# Patient Record
Sex: MALE | Race: WHITE | NOT HISPANIC OR LATINO | URBAN - METROPOLITAN AREA
[De-identification: names, ages, dates, MRNs, and addresses within clinical notes are randomized per-mention and may not be internally consistent; named-entity substitution may affect disease eponyms.]

---

## 2021-02-08 ENCOUNTER — IMPORTED ENCOUNTER (OUTPATIENT)
Dept: URBAN - METROPOLITAN AREA CLINIC 50 | Facility: CLINIC | Age: 81
End: 2021-02-08

## 2021-04-09 ENCOUNTER — IMPORTED ENCOUNTER (OUTPATIENT)
Dept: URBAN - METROPOLITAN AREA CLINIC 50 | Facility: CLINIC | Age: 81
End: 2021-04-09

## 2021-04-17 ASSESSMENT — VISUAL ACUITY
OS_BAT: 20/30
OS_CC: J1+
OD_BAT: 20/30
OD_CC: J1+
OD_CC: 20/25
OD_OTHER: 20/30. 20/40.
OS_OTHER: 20/30. 20/40.
OS_CC: 20/25

## 2021-04-17 ASSESSMENT — TONOMETRY
OS_IOP_MMHG: 11
OD_IOP_MMHG: 10

## 2021-04-26 ENCOUNTER — PRE-OP - (OUTPATIENT)
Dept: URBAN - METROPOLITAN AREA CLINIC 53 | Facility: CLINIC | Age: 81
End: 2021-04-26

## 2021-04-26 VITALS — DIASTOLIC BLOOD PRESSURE: 90 MMHG | HEIGHT: 60 IN | HEART RATE: 65 BPM | SYSTOLIC BLOOD PRESSURE: 134 MMHG

## 2021-04-26 DIAGNOSIS — H25.12: ICD-10-CM

## 2021-04-26 PROCEDURE — 99213 OFFICE O/P EST LOW 20 MIN: CPT

## 2021-04-26 ASSESSMENT — TONOMETRY
OD_IOP_MMHG: 11
OS_IOP_MMHG: 11

## 2021-04-26 ASSESSMENT — VISUAL ACUITY
OD_CC: 20/25-
OS_CC: 20/30-2

## 2021-04-26 NOTE — PATIENT DISCUSSION
CATARACT SURGERY PLANNER - STANDARD IOL/+FEMTO: Phacoemulsification with IOL: Eye: OS|DOS: 5/5/21|Model: [id number]|Power: 17. 5|Target: PLANO|Femto: YES|Arcs: 33 @ 005 ; 33 @ 185|Visc: DUET|Omidria: YES|10% Phenylephrine: NO|Epi-shugarcaine: YES|Phaco Setting: STD|BSS+: NO|Trypan Blue: NO|CTR: NO|Olive Tip: NO|Atropine: NO|Pupilloplasty: NO|Notes: OPRA.

## 2021-05-05 ENCOUNTER — SAME DAY PO (OUTPATIENT)
Dept: URBAN - METROPOLITAN AREA CLINIC 53 | Facility: CLINIC | Age: 81
End: 2021-05-05

## 2021-05-05 ENCOUNTER — SURGERY/PROCEDURE (OUTPATIENT)
Dept: URBAN - METROPOLITAN AREA SURGERY 16 | Facility: SURGERY | Age: 81
End: 2021-05-05

## 2021-05-05 DIAGNOSIS — Z96.1: ICD-10-CM

## 2021-05-05 DIAGNOSIS — H25.12: ICD-10-CM

## 2021-05-05 DIAGNOSIS — Z98.42: ICD-10-CM

## 2021-05-05 PROCEDURE — ZCB00FEMTO TECNIS MONOFOCAL IOL WITH FEMTO

## 2021-05-05 PROCEDURE — 66984 XCAPSL CTRC RMVL W/O ECP: CPT

## 2021-05-05 PROCEDURE — 99024 POSTOP FOLLOW-UP VISIT: CPT

## 2021-05-05 ASSESSMENT — TONOMETRY: OS_IOP_MMHG: 22

## 2021-05-05 ASSESSMENT — VISUAL ACUITY: OS_SC: 20/150

## 2021-05-10 ENCOUNTER — CATARACT PRE-OP (OUTPATIENT)
Dept: URBAN - METROPOLITAN AREA CLINIC 53 | Facility: CLINIC | Age: 81
End: 2021-05-10

## 2021-05-10 DIAGNOSIS — H25.11: ICD-10-CM

## 2021-05-10 PROCEDURE — PREOP PRE OP VISIT

## 2021-05-10 ASSESSMENT — VISUAL ACUITY
OD_CC: 20/25-
OS_SC: 20/40
OS_PH: 20/25-

## 2021-05-10 ASSESSMENT — TONOMETRY
OD_IOP_MMHG: 14
OS_IOP_MMHG: 14

## 2021-05-10 NOTE — PATIENT DISCUSSION
CATARACT SURGERY PLANNER - STANDARD IOL/+FEMTO: Phacoemulsification with IOL: Eye: Right|DOS: 5/19/21|Model: [id number]|Power: 17|Target: dist|Femto: yes|Arcs: 32 @ 60 ; 32 @ 240|Visc: duet|Omidria: yes|10% Phenylephrine: no|Epi-shugarcaine: yes|Phaco Setting: dense|BSS+: no|Trypan Blue: no|CTR: no|Olive Tip: no|Atropine: no|Pupilloplasty: no|Notes: no.

## 2021-05-19 ENCOUNTER — SURGERY/PROCEDURE (OUTPATIENT)
Dept: URBAN - METROPOLITAN AREA SURGERY 16 | Facility: SURGERY | Age: 81
End: 2021-05-19

## 2021-05-19 ENCOUNTER — SAME DAY PO (OUTPATIENT)
Dept: URBAN - METROPOLITAN AREA CLINIC 53 | Facility: CLINIC | Age: 81
End: 2021-05-19

## 2021-05-19 DIAGNOSIS — Z98.41: ICD-10-CM

## 2021-05-19 DIAGNOSIS — H25.11: ICD-10-CM

## 2021-05-19 DIAGNOSIS — Z96.1: ICD-10-CM

## 2021-05-19 PROCEDURE — 66984 XCAPSL CTRC RMVL W/O ECP: CPT

## 2021-05-19 PROCEDURE — 99024 POSTOP FOLLOW-UP VISIT: CPT

## 2021-05-19 PROCEDURE — ZCB00FEMTO TECNIS MONOFOCAL IOL WITH FEMTO

## 2021-05-19 ASSESSMENT — TONOMETRY: OD_IOP_MMHG: 21

## 2021-05-19 ASSESSMENT — VISUAL ACUITY: OD_SC: 20/70

## 2021-05-26 ENCOUNTER — 1 WEEK POST-OP (OUTPATIENT)
Dept: URBAN - METROPOLITAN AREA CLINIC 53 | Facility: CLINIC | Age: 81
End: 2021-05-26

## 2021-05-26 DIAGNOSIS — Z96.1: ICD-10-CM

## 2021-05-26 DIAGNOSIS — Z98.41: ICD-10-CM

## 2021-05-26 PROCEDURE — 99024 POSTOP FOLLOW-UP VISIT: CPT

## 2021-05-26 ASSESSMENT — VISUAL ACUITY
OD_SC: 20/20
OS_SC: 20/30-2

## 2021-05-26 ASSESSMENT — TONOMETRY
OS_IOP_MMHG: 14
OD_IOP_MMHG: 14

## 2021-10-25 ENCOUNTER — 5 MONTH FOLLOW-UP (OUTPATIENT)
Dept: URBAN - METROPOLITAN AREA CLINIC 53 | Facility: CLINIC | Age: 81
End: 2021-10-25

## 2021-10-25 DIAGNOSIS — H43.813: ICD-10-CM

## 2021-10-25 DIAGNOSIS — H26.492: ICD-10-CM

## 2021-10-25 PROCEDURE — 92015NC REFRACTION NO CHARGE

## 2021-10-25 PROCEDURE — 66821 AFTER CATARACT LASER SURGERY: CPT

## 2021-10-25 PROCEDURE — 92134 CPTRZ OPH DX IMG PST SGM RTA: CPT

## 2021-10-25 PROCEDURE — 92014 COMPRE OPH EXAM EST PT 1/>: CPT

## 2021-10-25 RX ORDER — PREDNISOLONE ACETATE 10 MG/ML
1 SUSPENSION/ DROPS OPHTHALMIC TWICE A DAY
Start: 2021-10-25

## 2021-10-25 ASSESSMENT — VISUAL ACUITY
OD_SC: 20/20
OS_CC: J1+
OS_GLARE: 20/30
OS_GLARE: 20/30
OD_GLARE: 20/25
OS_PH: 20/20
OS_SC: 20/40
OD_GLARE: 20/25
OD_CC: J1+

## 2021-10-25 ASSESSMENT — TONOMETRY
OS_IOP_MMHG: 12
OD_IOP_MMHG: 12

## 2021-10-25 NOTE — PROCEDURE NOTE: CLINICAL
PROCEDURE NOTE: YAG Capsulotomy #1 OS. Diagnosis: Posterior Capsular Opacification (PCO). Prep: Mydriacil 1% and Phenylephrine 2.5%. Prior to treatment, the risks/benefits/alternatives were discussed. The patient wished to proceed with procedure. Consent was signed. Proparacaine and brominidine were placed into the operative eye after the eye was dilated. Power = 3.6mJ. Number of pulses = 43. Patient tolerated procedure well and there were no complications. Post Laser instructions given. Jaquan Cunningham

## 2021-10-25 NOTE — PATIENT DISCUSSION
PCO ([id number] [place] [id number]): Visually significant PCO present on exam today. Recommend YAG laser capsulotomy to improve vision and decrease glare symptoms. RBAs of procedure discussed. Patient agrees and wishes to proceed.

## 2021-12-03 ENCOUNTER — POST-OP (OUTPATIENT)
Dept: URBAN - METROPOLITAN AREA CLINIC 53 | Facility: CLINIC | Age: 81
End: 2021-12-03

## 2021-12-03 DIAGNOSIS — H26.492: ICD-10-CM

## 2021-12-03 DIAGNOSIS — H26.491: ICD-10-CM

## 2021-12-03 DIAGNOSIS — Z98.890: ICD-10-CM

## 2021-12-03 PROCEDURE — 99024 POSTOP FOLLOW-UP VISIT: CPT

## 2021-12-03 NOTE — PATIENT DISCUSSION
PCO ([address] [place] [address]): Visually significant PCO present on exam today. Recommend YAG laser capsulotomy to improve vision and decrease glare symptoms. RBAs of procedure discussed. Patient agrees and wishes to proceed.

## 2021-12-07 ASSESSMENT — VISUAL ACUITY
OS_PH: 20/20
OD_SC: 20/20
OS_SC: 20/40

## 2021-12-07 ASSESSMENT — TONOMETRY
OS_IOP_MMHG: 10
OD_IOP_MMHG: 9

## 2023-10-06 ENCOUNTER — COMPREHENSIVE EXAM (OUTPATIENT)
Dept: URBAN - METROPOLITAN AREA CLINIC 53 | Facility: CLINIC | Age: 83
End: 2023-10-06

## 2023-10-06 DIAGNOSIS — H04.123: ICD-10-CM

## 2023-10-06 DIAGNOSIS — H26.491: ICD-10-CM

## 2023-10-06 DIAGNOSIS — H43.813: ICD-10-CM

## 2023-10-06 PROCEDURE — 92014 COMPRE OPH EXAM EST PT 1/>: CPT

## 2023-10-06 ASSESSMENT — TONOMETRY
OD_IOP_MMHG: 10
OS_IOP_MMHG: 10

## 2023-10-06 ASSESSMENT — VISUAL ACUITY
OD_GLARE: 20/25
OD_GLARE: 20/25
OS_SC: 20/60-2
OU_SC: J5
OS_PH: 20/30
OD_SC: 20/20

## 2024-11-14 ENCOUNTER — COMPREHENSIVE EXAM (OUTPATIENT)
Dept: URBAN - METROPOLITAN AREA CLINIC 53 | Facility: CLINIC | Age: 84
End: 2024-11-14

## 2024-11-14 DIAGNOSIS — H52.4: ICD-10-CM

## 2024-11-14 DIAGNOSIS — H43.813: ICD-10-CM

## 2024-11-14 DIAGNOSIS — H04.123: ICD-10-CM

## 2024-11-14 DIAGNOSIS — H26.491: ICD-10-CM

## 2024-11-14 PROCEDURE — 92015 DETERMINE REFRACTIVE STATE: CPT

## 2024-11-14 PROCEDURE — 99214 OFFICE O/P EST MOD 30 MIN: CPT
